# Patient Record
(demographics unavailable — no encounter records)

---

## 2025-03-06 NOTE — HISTORY OF PRESENT ILLNESS
[Condoms] : uses condoms [Y] : Patient is sexually active [Monogamous (Male Partner)] : is monogamous with a male partner [Regular Cycle Intervals] : periods have been regular [FreeTextEntry1] : GYN Annual [TextBox_4] : 37YO patient presents for GYN annual exam. Family hx of breast cancer, Mother at the age of 55. Patient has been feeling breast tenderness and experiencing anxiety. Declines birth control will use condoms. PHQ(-4, patient does not desire referral for therapy and feels fatigue is just daily living [LMPDate] : 2/25/25 [PGxTotal] : 1 [[facility] CenterxF[facility]erm] : 1 [[facility] Center[facility]] : 1

## 2025-03-06 NOTE — PHYSICAL EXAM
[Chaperone Present] : A chaperone was present in the examining room during all aspects of the physical examination [Appropriately responsive] : appropriately responsive [Alert] : alert [No Acute Distress] : no acute distress [No Lymphadenopathy] : no lymphadenopathy [Soft] : soft [Non-tender] : non-tender [Non-distended] : non-distended [Oriented x3] : oriented x3 [Examination Of The Breasts] : a normal appearance [Breast Palpation Diffuse Fibrous Tissue Bilateral] : fibrocystic changes [No Discharge] : no discharge [No Masses] : no breast masses were palpable [Labia Majora] : normal [Labia Minora] : normal [No Bleeding] : There was no active vaginal bleeding [Normal] : normal [Uterine Adnexae] : normal [FreeTextEntry2] : Angela [FreeTextEntry6] : no nipple discharge, no tenderness and no adenopathy

## 2025-03-06 NOTE — DISCUSSION/SUMMARY
[FreeTextEntry1] :  GYN Annual evaluation today -pap smear sent All pt's questions were answered to their apparent satisfaction. Rx given for mammogram and B sonogram RTO in 1 year for annual PHQ9-4  The patient has been counseled regarding the following topics: patient screened for depression - no signs of clinical depression. PHQ9 scores reviewed over the course of the visit. 5-10 minutes of face to face time.   I, Angela parkinson acting as scribe for Dr. Raygoza. 03/06/2025